# Patient Record
Sex: MALE | Race: WHITE | NOT HISPANIC OR LATINO | URBAN - METROPOLITAN AREA
[De-identification: names, ages, dates, MRNs, and addresses within clinical notes are randomized per-mention and may not be internally consistent; named-entity substitution may affect disease eponyms.]

---

## 2017-11-30 ENCOUNTER — FOLLOW UP ESTABLISHED (OUTPATIENT)
Dept: URBAN - METROPOLITAN AREA CLINIC 30 | Facility: CLINIC | Age: 72
End: 2017-11-30
Payer: COMMERCIAL

## 2017-11-30 DIAGNOSIS — H17.821 PERIPHERAL OPACITY OF CORNEA OF RIGHT EYE: Primary | ICD-10-CM

## 2017-11-30 PROCEDURE — 92014 COMPRE OPH EXAM EST PT 1/>: CPT | Performed by: OPTOMETRIST

## 2017-11-30 ASSESSMENT — INTRAOCULAR PRESSURE
OD: 17
OS: 20

## 2017-11-30 ASSESSMENT — VISUAL ACUITY
OD: 20/20
OS: 20/20

## 2017-11-30 ASSESSMENT — KERATOMETRY
OS: 43.72
OD: 43.77

## 2018-11-29 ENCOUNTER — FOLLOW UP ESTABLISHED (OUTPATIENT)
Dept: URBAN - METROPOLITAN AREA CLINIC 30 | Facility: CLINIC | Age: 73
End: 2018-11-29
Payer: COMMERCIAL

## 2018-11-29 DIAGNOSIS — H35.372 PUCKERING OF MACULA, LEFT EYE: Primary | ICD-10-CM

## 2018-11-29 DIAGNOSIS — H17.823 PERIPHERAL OPACITY OF CORNEA, BILATERAL: ICD-10-CM

## 2018-11-29 PROCEDURE — 92134 CPTRZ OPH DX IMG PST SGM RTA: CPT | Performed by: OPTOMETRIST

## 2018-11-29 PROCEDURE — 92014 COMPRE OPH EXAM EST PT 1/>: CPT | Performed by: OPTOMETRIST

## 2018-11-29 PROCEDURE — 92015 DETERMINE REFRACTIVE STATE: CPT | Performed by: OPTOMETRIST

## 2018-11-29 ASSESSMENT — KERATOMETRY
OS: 43.74
OD: 43.86

## 2018-11-29 ASSESSMENT — INTRAOCULAR PRESSURE
OD: 17
OS: 19

## 2018-11-29 ASSESSMENT — VISUAL ACUITY
OS: 20/20
OD: 20/20

## 2020-07-08 ENCOUNTER — FOLLOW UP ESTABLISHED (OUTPATIENT)
Dept: URBAN - METROPOLITAN AREA CLINIC 30 | Facility: CLINIC | Age: 75
End: 2020-07-08
Payer: COMMERCIAL

## 2020-07-08 DIAGNOSIS — H53.2 DIPLOPIA: ICD-10-CM

## 2020-07-08 DIAGNOSIS — H52.4 PRESBYOPIA: Primary | ICD-10-CM

## 2020-07-08 PROCEDURE — 92014 COMPRE OPH EXAM EST PT 1/>: CPT | Performed by: OPTOMETRIST

## 2020-07-08 PROCEDURE — 92015 DETERMINE REFRACTIVE STATE: CPT | Performed by: OPTOMETRIST

## 2020-07-08 ASSESSMENT — VISUAL ACUITY
OS: 20/40
OD: 20/25

## 2020-07-08 ASSESSMENT — INTRAOCULAR PRESSURE
OD: 20
OS: 20

## 2020-07-08 ASSESSMENT — KERATOMETRY
OS: 43.74
OD: 43.72

## 2021-03-18 ENCOUNTER — FOLLOW UP ESTABLISHED (OUTPATIENT)
Dept: URBAN - METROPOLITAN AREA CLINIC 30 | Facility: CLINIC | Age: 76
End: 2021-03-18
Payer: COMMERCIAL

## 2021-03-18 DIAGNOSIS — Z96.1 PRESENCE OF INTRAOCULAR LENS: ICD-10-CM

## 2021-03-18 DIAGNOSIS — H43.22 CRYSTALLINE DEPOSITS IN VITREOUS BODY, LEFT EYE: ICD-10-CM

## 2021-03-18 PROCEDURE — 92014 COMPRE OPH EXAM EST PT 1/>: CPT | Performed by: OPTOMETRIST

## 2021-03-18 PROCEDURE — 92134 CPTRZ OPH DX IMG PST SGM RTA: CPT | Performed by: OPTOMETRIST

## 2021-03-18 ASSESSMENT — VISUAL ACUITY
OS: 20/30
OD: 20/25

## 2021-03-18 ASSESSMENT — INTRAOCULAR PRESSURE
OS: 16
OD: 15

## 2023-01-11 ENCOUNTER — OFFICE VISIT (OUTPATIENT)
Dept: URBAN - METROPOLITAN AREA CLINIC 30 | Facility: CLINIC | Age: 78
End: 2023-01-11
Payer: COMMERCIAL

## 2023-01-11 DIAGNOSIS — H53.2 DIPLOPIA: ICD-10-CM

## 2023-01-11 DIAGNOSIS — H35.372 PUCKERING OF MACULA, LEFT EYE: ICD-10-CM

## 2023-01-11 DIAGNOSIS — H52.4 PRESBYOPIA: ICD-10-CM

## 2023-01-11 DIAGNOSIS — H43.22 CRYSTALLINE DEPOSITS IN VITREOUS BODY, LEFT EYE: Primary | ICD-10-CM

## 2023-01-11 DIAGNOSIS — H17.823 PERIPHERAL OPACITY OF CORNEA, BILATERAL: ICD-10-CM

## 2023-01-11 DIAGNOSIS — H43.811 VITREOUS DEGENERATION, RIGHT EYE: ICD-10-CM

## 2023-01-11 DIAGNOSIS — H35.373 PUCKERING OF MACULA, BILATERAL: ICD-10-CM

## 2023-01-11 PROCEDURE — 92014 COMPRE OPH EXAM EST PT 1/>: CPT | Performed by: OPTOMETRIST

## 2023-01-11 PROCEDURE — 92134 CPTRZ OPH DX IMG PST SGM RTA: CPT | Performed by: OPTOMETRIST

## 2023-01-11 ASSESSMENT — VISUAL ACUITY
OS: 20/30
OD: 20/25

## 2023-01-11 ASSESSMENT — INTRAOCULAR PRESSURE
OD: 17
OS: 18

## 2023-01-11 ASSESSMENT — KERATOMETRY
OS: 45.63
OD: 44.75

## 2023-01-11 NOTE — IMPRESSION/PLAN
Impression: Puckering of macula, bilateral: H35.373. Plan: Now mild ERM noted OD. Stable OS and minimal OU, MAC OCT updated today. Pt to call if sudden decrease in vision occurs.

## 2023-01-11 NOTE — IMPRESSION/PLAN
Impression: Crystalline deposits in vitreous body, left eye Plan: Retinas flat and attached OU. Reviewed signs and symptoms of RD and to call asap if occurs.

## 2023-01-11 NOTE — IMPRESSION/PLAN
Impression: Diplopia Plan: Denies diplopia c current spec Rx. CT 7/20 Esophoria at distance and near. CT cc 1/23: stable, no diplopia, keep habitual prism. EOMs full.

## 2023-01-11 NOTE — IMPRESSION/PLAN
Impression: Peripheral opacity of cornea, bilateral Plan: Stable. Longstanding. Due to trauma during "[employer]" in . [place] 29.

## 2024-01-29 ENCOUNTER — OFFICE VISIT (OUTPATIENT)
Dept: URBAN - METROPOLITAN AREA CLINIC 30 | Facility: CLINIC | Age: 79
End: 2024-01-29
Payer: COMMERCIAL

## 2024-01-29 DIAGNOSIS — H52.4 PRESBYOPIA: ICD-10-CM

## 2024-01-29 DIAGNOSIS — H43.813 VITREOUS DEGENERATION, BILATERAL: ICD-10-CM

## 2024-01-29 DIAGNOSIS — H53.2 DIPLOPIA: ICD-10-CM

## 2024-01-29 DIAGNOSIS — H35.373 PUCKERING OF MACULA, BILATERAL: Primary | ICD-10-CM

## 2024-01-29 PROCEDURE — 92134 CPTRZ OPH DX IMG PST SGM RTA: CPT | Performed by: OPTOMETRIST

## 2024-01-29 PROCEDURE — 92014 COMPRE OPH EXAM EST PT 1/>: CPT | Performed by: OPTOMETRIST

## 2024-01-29 ASSESSMENT — INTRAOCULAR PRESSURE
OS: 19
OD: 19

## 2024-01-29 ASSESSMENT — KERATOMETRY
OD: 43.98
OS: 43.60

## 2024-01-29 ASSESSMENT — VISUAL ACUITY
OD: 20/25
OS: 20/30

## 2025-03-05 ENCOUNTER — OFFICE VISIT (OUTPATIENT)
Dept: URBAN - METROPOLITAN AREA CLINIC 30 | Facility: CLINIC | Age: 80
End: 2025-03-05
Payer: COMMERCIAL

## 2025-03-05 DIAGNOSIS — H53.2 DIPLOPIA: ICD-10-CM

## 2025-03-05 DIAGNOSIS — H43.813 VITREOUS DEGENERATION, BILATERAL: ICD-10-CM

## 2025-03-05 DIAGNOSIS — H52.4 PRESBYOPIA: ICD-10-CM

## 2025-03-05 DIAGNOSIS — H35.373 PUCKERING OF MACULA, BILATERAL: Primary | ICD-10-CM

## 2025-03-05 DIAGNOSIS — Z96.1 PRESENCE OF INTRAOCULAR LENS: ICD-10-CM

## 2025-03-05 ASSESSMENT — KERATOMETRY
OS: 43.75
OD: 44.13

## 2025-03-05 ASSESSMENT — VISUAL ACUITY
OD: 20/25
OS: 20/25

## 2025-03-05 ASSESSMENT — INTRAOCULAR PRESSURE
OS: 17
OD: 16